# Patient Record
Sex: MALE | Race: WHITE | HISPANIC OR LATINO | Employment: FULL TIME | URBAN - METROPOLITAN AREA
[De-identification: names, ages, dates, MRNs, and addresses within clinical notes are randomized per-mention and may not be internally consistent; named-entity substitution may affect disease eponyms.]

---

## 2021-04-16 ENCOUNTER — HOSPITAL ENCOUNTER (EMERGENCY)
Facility: HOSPITAL | Age: 56
Discharge: HOME | End: 2021-04-16
Attending: SURGERY | Admitting: SURGERY
Payer: COMMERCIAL

## 2021-04-16 ENCOUNTER — APPOINTMENT (EMERGENCY)
Dept: RADIOLOGY | Facility: HOSPITAL | Age: 56
End: 2021-04-16
Payer: COMMERCIAL

## 2021-04-16 ENCOUNTER — APPOINTMENT (EMERGENCY)
Dept: CT IMAGING | Facility: HOSPITAL | Age: 56
End: 2021-04-16
Payer: COMMERCIAL

## 2021-04-16 VITALS
BODY MASS INDEX: 33.95 KG/M2 | SYSTOLIC BLOOD PRESSURE: 116 MMHG | TEMPERATURE: 97.2 F | HEART RATE: 74 BPM | OXYGEN SATURATION: 93 % | RESPIRATION RATE: 18 BRPM | WEIGHT: 223.99 LBS | DIASTOLIC BLOOD PRESSURE: 76 MMHG | HEIGHT: 68 IN

## 2021-04-16 DIAGNOSIS — S01.81XA LACERATION OF FOREHEAD, INITIAL ENCOUNTER: ICD-10-CM
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
## 2021-04-16 LAB
BASE EXCESS BLDA CALC-SCNC: 2 MMOL/L (ref -2–3)
GLUCOSE SERPL-MCNC: 144 MG/DL (ref 65–140)
HCO3 BLDA-SCNC: 27.2 MMOL/L (ref 24–30)
HCT VFR BLD CALC: 47 % (ref 36.5–49.3)
HGB BLDA-MCNC: 16 G/DL (ref 12–17)
PCO2 BLD: 28 MMOL/L (ref 21–32)
PCO2 BLD: 42.2 MM HG (ref 42–50)
PH BLD: 7.42 [PH] (ref 7.3–7.4)
PO2 BLD: 47 MM HG (ref 35–45)
POTASSIUM BLD-SCNC: 5.8 MMOL/L (ref 3.5–5.3)
SAO2 % BLD FROM PO2: 83 % (ref 60–85)
SODIUM BLD-SCNC: 136 MMOL/L (ref 136–145)
SPECIMEN SOURCE: ABNORMAL

## 2021-04-16 PROCEDURE — 71045 X-RAY EXAM CHEST 1 VIEW: CPT

## 2021-04-16 PROCEDURE — 82803 BLOOD GASES ANY COMBINATION: CPT

## 2021-04-16 PROCEDURE — 99284 EMERGENCY DEPT VISIT MOD MDM: CPT | Performed by: SURGERY

## 2021-04-16 PROCEDURE — 85014 HEMATOCRIT: CPT

## 2021-04-16 PROCEDURE — 82947 ASSAY GLUCOSE BLOOD QUANT: CPT

## 2021-04-16 PROCEDURE — 99284 EMERGENCY DEPT VISIT MOD MDM: CPT

## 2021-04-16 PROCEDURE — 93308 TTE F-UP OR LMTD: CPT | Performed by: SURGERY

## 2021-04-16 PROCEDURE — 76705 ECHO EXAM OF ABDOMEN: CPT | Performed by: SURGERY

## 2021-04-16 PROCEDURE — 90715 TDAP VACCINE 7 YRS/> IM: CPT | Performed by: NURSE PRACTITIONER

## 2021-04-16 PROCEDURE — 70450 CT HEAD/BRAIN W/O DYE: CPT

## 2021-04-16 PROCEDURE — NC001 PR NO CHARGE: Performed by: SURGERY

## 2021-04-16 PROCEDURE — 84295 ASSAY OF SERUM SODIUM: CPT

## 2021-04-16 PROCEDURE — 84132 ASSAY OF SERUM POTASSIUM: CPT

## 2021-04-16 PROCEDURE — NC001 PR NO CHARGE: Performed by: EMERGENCY MEDICINE

## 2021-04-16 PROCEDURE — 12014 RPR F/E/E/N/L/M 5.1-7.5 CM: CPT | Performed by: SURGERY

## 2021-04-16 PROCEDURE — 72125 CT NECK SPINE W/O DYE: CPT

## 2021-04-16 PROCEDURE — 90471 IMMUNIZATION ADMIN: CPT

## 2021-04-16 RX ORDER — ACETAMINOPHEN 325 MG/1
975 TABLET ORAL ONCE
Status: COMPLETED | OUTPATIENT
Start: 2021-04-16 | End: 2021-04-16

## 2021-04-16 RX ORDER — LIDOCAINE HYDROCHLORIDE AND EPINEPHRINE 10; 10 MG/ML; UG/ML
10 INJECTION, SOLUTION INFILTRATION; PERINEURAL ONCE
Status: COMPLETED | OUTPATIENT
Start: 2021-04-16 | End: 2021-04-16

## 2021-04-16 RX ORDER — GINSENG 100 MG
1 CAPSULE ORAL ONCE
Status: COMPLETED | OUTPATIENT
Start: 2021-04-16 | End: 2021-04-16

## 2021-04-16 RX ORDER — ACETAMINOPHEN 325 MG/1
975 TABLET ORAL EVERY 6 HOURS PRN
Status: DISCONTINUED | OUTPATIENT
Start: 2021-04-16 | End: 2021-04-16

## 2021-04-16 RX ADMIN — LIDOCAINE HYDROCHLORIDE AND EPINEPHRINE 10 ML: 10; 10 INJECTION, SOLUTION INFILTRATION; PERINEURAL at 03:11

## 2021-04-16 RX ADMIN — TETANUS TOXOID, REDUCED DIPHTHERIA TOXOID AND ACELLULAR PERTUSSIS VACCINE, ADSORBED 0.5 ML: 5; 2.5; 8; 8; 2.5 SUSPENSION INTRAMUSCULAR at 02:56

## 2021-04-16 RX ADMIN — ACETAMINOPHEN 975 MG: 325 TABLET, FILM COATED ORAL at 03:43

## 2021-04-16 RX ADMIN — BACITRACIN 1 SMALL APPLICATION: 500 OINTMENT TOPICAL at 03:47

## 2021-04-16 NOTE — ED PROVIDER NOTES
Emergency Department Airway Evaluation and Management Form    History  Obtained from:  Patient  Patient has no known allergies  Chief Complaint   Patient presents with    Head Laceration     Pt reports waking up in middle of the night to use restroom, tripped in the dark, went down approx 14 steps -thinners -LOC A/Ox4  +lac to L head     HPI     Patient is a 42-year-old male who reports to the emergency department after falling down 14 stairs  No loss of consciousness  He has a laceration to the left side of his head  He denies any blood thinners  Patient was drinking alcohol tonight  MDM - fall,  airway intact will hand off to trauma team        History reviewed  No pertinent past medical history  History reviewed  No pertinent surgical history  History reviewed  No pertinent family history  Social History     Tobacco Use    Smoking status: Never Smoker    Smokeless tobacco: Never Used   Substance Use Topics    Alcohol use: Not Currently     Frequency: Never    Drug use: Never     I have reviewed and agree with the history as documented  Review of Systems    See trauma h/p           Physical Exam  /81   Pulse 78   Temp (!) 97 2 °F (36 2 °C) (Temporal)   Resp 18   Ht 5' 8" (1 727 m)   Wt 102 kg (223 lb 15 8 oz)   SpO2 94%   BMI 34 06 kg/m²     Physical Exam    See trauma h/p          ED Medications  Medications   tetanus-diphtheria-acellular pertussis (BOOSTRIX) IM injection 0 5 mL (has no administration in time range)   lidocaine-epinephrine (XYLOCAINE/EPINEPHRINE) 1 %-1:100,000 injection 10 mL (has no administration in time range)       Intubation  Procedures    Notes      Final Diagnosis  Final diagnoses:   Fall, initial encounter       ED Provider  Electronically Signed by     Luis Gregg MD  04/16/21 [id number]

## 2021-04-16 NOTE — H&P
H&P Exam - [address] [facility] See Castaneda 60 y o  male MRN: [id number]  Unit/Bed#: ED 29 Encounter: [id number]    Assessment/Plan   Trauma Alert: Level B  Model of Arrival: Private vehicle  Driven by significant other  Trauma Team: Mell Obrien and NIMCO Gutierrez  Consultants: None    Trauma Active Problems:   · Fall, Down 14 strairs  · Head laceration    Trauma Plan:   · CT head and c-spine negative  · C-spine cleared  · Tetanus updated  · Laceration closed  Removal and care instructions discussed with patient and significant other  · Discussed taking tylenol or ibuprofen for pain  · Return precautions discussed with patient and sister, no questions at the time of discharge  Discharged with sister  Chief Complaint: Fall    History of Present Illness   HPI:  Stanford Bonner is a 60 y o  male who presents for evaluation following a fall down approximately 14 stairs  Described tripping  No LOC  No AC/PC  Notes laceration on head  Adamantly denies any other any other injuries  He confirms that he drank 5 beers earlier this evening  Mechanism:Fall    Review of Systems    12-point, complete review of systems was reviewed and negative except as stated above  Historical Information   History is unobtainable from the patient due patient reluctant to participate in exam   Efforts to obtain history included the following sources: family member, other medical personnel    History reviewed  No pertinent past medical history  History reviewed  No pertinent surgical history    Social History   Social History     Substance and Sexual Activity   Alcohol Use Not Currently    Frequency: Never     Social History     Substance and Sexual Activity   Drug Use Never     Social History     Tobacco Use   Smoking Status Never Smoker   Smokeless Tobacco Never Used     E-Cigarette/Vaping     E-Cigarette/Vaping Substances     Immunization History   Administered Date(s) Administered    Tdap 04/16/2021     Last Tetanus: Unknown  Updated  Family History: Non-contributory  Unable to obtain/limited by patient  Meds/Allergies   all current active meds have been reviewed    No Known Allergies      PHYSICAL EXAM    Objective   Vitals:   First set: Temperature: (!) 97 2 °F (36 2 °C) (04/16/21 0224)  Pulse: 75 (04/16/21 0219)  Respirations: 18 (04/16/21 0219)  Blood Pressure: 150/94 (04/16/21 0219)    Primary Survey:   (A) Airway: Patent  (B) Breathing: CTA, Bilaterally  (C) Circulation: Pulses:   normal, pedal  2/4 and radial  2/4  (D) Disabliity:  GCS Total:  15  (E) Expose:  Completed    Secondary Survey: (Click on Physical Exam tab above)  Physical Exam  Constitutional:       General: He is not in acute distress  Appearance: He is not toxic-appearing or diaphoretic  HENT:      Head: Normocephalic  No Dodd's sign, abrasion, right periorbital erythema or left periorbital erythema  Jaw: There is normal jaw occlusion  No trismus, tenderness, swelling or pain on movement  Right Ear: External ear normal       Left Ear: External ear normal       Nose: Nose normal  No nasal deformity  Mouth/Throat:      Mouth: Mucous membranes are moist    Eyes:      General: Lids are normal       Extraocular Movements: Extraocular movements intact  Conjunctiva/sclera: Conjunctivae normal       Pupils: Pupils are equal, round, and reactive to light  Neck:      Comments: C-Collar in place  Cardiovascular:      Rate and Rhythm: Normal rate and regular rhythm  Pulses: Normal pulses  Radial pulses are 2+ on the right side and 2+ on the left side  Dorsalis pedis pulses are 2+ on the right side and 2+ on the left side  Heart sounds: Normal heart sounds  No murmur  No friction rub  No gallop  Pulmonary:      Effort: Pulmonary effort is normal       Breath sounds: Normal breath sounds and air entry  No decreased breath sounds, wheezing, rhonchi or rales     Chest: Chest wall: No deformity, swelling or tenderness  Abdominal:      Palpations: Abdomen is soft  Tenderness: There is no abdominal tenderness  Genitourinary:     Comments: Pelvis stable  Musculoskeletal:      Comments: Patient SANTANA with 5/5 strength  No deformities  Skin:     General: Skin is warm  Capillary Refill: Capillary refill takes less than 2 seconds  Neurological:      General: No focal deficit present  Mental Status: He is alert and oriented to person, place, and time  GCS: GCS eye subscore is 4  GCS verbal subscore is 5  GCS motor subscore is 6  Sensory: Sensation is intact  Motor: Motor function is intact  Psychiatric:         Mood and Affect: Affect is blunt and angry  Speech: Speech normal          Behavior: Behavior is uncooperative  Cognition and Memory: Cognition and memory normal       Comments: Patient's mood and behavior improved through time in the ED  Clinically sober on exam           Invasive Devices     None                 Lab Results: Results: I have personally reviewed pertinent reports  ISTAT: unremarkable     Imaging/EKG Studies: FAST: negative   CT Head: negative  CT C-spine: negative  Other Studies: none    Code Status: No Order  Advance Directive and Living Will:      Power of :    POLST:

## 2021-04-16 NOTE — DISCHARGE INSTRUCTIONS
Ensure that you get your sutures removed in 7 days  Five sutures were placed (2 horizontal mattresses, 3 simple)  Return to the ER if he develops any of the symptoms that we discussed  Laceración   LO QUE NECESITA SABER:   Yuriy laceración es yuriy herida que se presenta en la piel y en el tejido blando que hay debajo de colin  Las laceraciones pueden presentarse en cualquier parte del cuerpo  INSTRUCCIONES SOBRE EL [facility]L HOSPITALARIA:   Regrese a la iraida de emergencias si:  · Está sangrando mucho o tiene sangrado que no para después de 10 minutos de aplicar presión firme y directa sobre la herida  · Nunez herida se abre  Llame a nunez médico si:  · Usted tiene fiebre o escalofríos  · Nunez laceración está enrojecida, tibia o inflamada  · En la piel de nunez herida le salen unas patti bruce  · Usted tiene drenaje cooper o amarillo saliendo de la herida que tiene mal [place]  · Usted tiene dolor que está empeorando después del [place]  · Usted tiene preguntas o inquietudes acerca de nunez condición o cuidado  Medicamentos: Es posible que usted necesite alguno de los siguientes:  · Puede administrarse podrían administrarse  Pregunte al médico cómo debe nishant talia medicamento de forma turner  Algunos medicamentos recetados para el dolor contienen acetaminofén  No tome otros medicamentos que contengan acetaminofén sin consultarlo con nunez médico  Demasiado acetaminofeno puede causar daño al hígado  Los medicamentos recetados para el dolor podrían causar estreñimiento  Pregunte a nunez médico brianna prevenir o tratar estreñimiento  · Los antibióticos ayudan a tratar o prevenir infecciones bacteriales  · [place] tami medicamentos brianna se le haya indicado  Consulte con nunez médico si usted karsten que nunez medicamento no le está ayudando o si presenta efectos secundarios  Infórmele si es alérgico a cualquier medicamento  Mantenga yuriy lista actualizada de los [employer], las vitaminas y los productos herbales que robert  Incluya los siguientes datos de los medicamentos: cantidad, frecuencia y motivo de administración  Traiga con usted la lista o los envases de las píldoras a tami citas de seguimiento  Lleve la lista de los medicamentos con usted en shelby de nevin emergencia  Siga las instrucciones de valle médico sobre el cuidado de tami heridas:  · No moje la herida  hasta que valle médico lo autorice  No sumerja valle herida en agua  No vaya a nadar hasta que valle médico lo autorice  Lave cuidadosamente la herida con agua y [place]  Seque el área con palmadas suaves o permita que se seque al aire  · [place] tami vendas cuando se mojen, estén sucios o después del lavado  Aplique un vendaje limpio según las indicaciones  No se aplique un vendaje elástico ni cinta muy apretada  No se aplique polvos ni nevin loción en valle incisión  · Aplique un ungüento antibiótico brianna se indica  Valle médico le puede formular un ungüento antibiótico para que se aplique sobre valle herida en shelby que tenga puntos de sutura  En shelby de tener cintas o tiras sobre valle incisión, permita que se sequen y se caigan solas  En sehlby que no se caigan en 14 días, retírelas con cuidado  Si usted tiene [employer] herida, no lo retire ni se lo moleste  Si el pegamento se , no lo reemplace con pegamento casero  · Revise valle herida todos los días para detectar signos de infección, brianna hinchazón, enrojecimiento o pus  Cuidados personales:  · Aplique hielo en la herida de 15 a 20 minutos cada hora o brianna se le indique  Use nevin compresa de hielo o ponga hielo triturado en nevin bolsa de plástico  [place] con nevin toalla  El hielo ayuda a evitar daño al tejido y a disminuir la inflamación y el dolor  · Use nevin férula según las indicaciones  Nevin férula lo inmovilizará y disminuirá la tensión sobre la herida  Es posible que le sirva para recuperarse más rápido  Nyla Dirk se puede usar para nevin laceración [employer] articulaciones o las áreas de valle cuerpo que se doblan  Pregunte a valle médico cómo se debe colocar y retirar valle férula  · Brian[place]urt cicatrización de valle herida aplicando un ungüento o pomada según las indicaciones  No se aplique pomadas en la herida hasta que valle médico se lo indique  Es posible que necesite esperar hasta que la herida sane  Pregunte cuál pomada debe comprar y la frecuencia con que debe usarla  Después de que la herida sane, use un bloqueador de sol sobre el área cuando se encuentre expuesta al sol  Usted debe hacer esto krista al menos 6 meses hasta 1 año después de cedric sufrido Sharlett Mais  Acuda a la consulta de control con valle médico según las indicaciones: Es posible que usted necesite programar yuriy yaima de seguimiento dentro de 24 a 50 horas para que controlen que la herida no se haya infectado  Usted tendrá que regresar dentro de 3 a 15 días para que le retiren los puntos de sutura o grapas  Cuide valle herida según las indicaciones para prevenir yuriy infección y ayudarla a sanar  Anote tami preguntas para que se acuerde de hacerlas krista tami visitas  © Copyright [address] Information is for End User's use only and may not be sold, redistributed or otherwise used for commercial purposes  All illustrations and images included in CareNotes® are the copyrighted property of [employer]  or [address] [place] [address] es sólo para uso en educación  Valle intención no es darle un consejo médico sobre enfermedades o tratamientos  Colsulte con valle [place] farmacéutico antes de seguir cualquier régimen médico para saber si es seguro y efectivo para usted

## 2021-04-16 NOTE — QUICK NOTE
Cervical Collar Clearance: The patient had a CT scan of the cervical spine demonstrating no acute injury  On exam, the patient had no midline point tenderness or paresthesias/numbness/weakness in the extremities  The patient had full range of motion (was then able to flex, extend, and rotate head laterally) without pain  There were no distracting injuries and the patient is clinically sober  The patient's cervical spine was cleared radiologically and clinically  Cervical collar removed at this time       GISSEL Wilson  4/16/2021 3:05 AM

## 2021-04-16 NOTE — PROCEDURES
POC FAST US    Date/Time: 4/16/2021 2:50 AM  Performed by: GISSEL Corral  Authorized by: Umu Corral     Patient location:  ED  Procedure details:     Exam Type:  Diagnostic    Indications: blunt abdominal trauma      Assess for:  Intra-abdominal fluid and pericardial effusion    Technique: FAST      Views obtained:  Heart - Pericardial sac, RUQ - Moran's Pouch, LUQ - Splenorenal space and Suprapubic - Pouch of Petey    Image quality: diagnostic      Image availability:  Images available in PACS  FAST Findings:     RUQ (Hepatorenal) free fluid: absent      LUQ (Splenorenal) free fluid: absent      Suprapubic free fluid: absent      Cardiac wall motion: identified      Pericardial effusion: absent    Interpretation:     Impressions: negative

## 2021-04-16 NOTE — PROCEDURES
Laceration repair    Date/Time: 4/16/2021 3:37 AM  Performed by: GISSEL Higgins  Authorized by: GISSEL Higgins   Consent: Verbal consent obtained  Risks and benefits: risks, benefits and alternatives were discussed  Consent given by: patient (Sister)  Patient understanding: patient states understanding of the procedure being performed  Radiology Images displayed and confirmed  If images not available, report reviewed: imaging studies available  Patient identity confirmed: verbally with patient and arm band  Body area: head/neck  Location details: forehead  Laceration length: 6 cm  Foreign bodies: no foreign bodies    Anesthesia:  Local Anesthetic: lidocaine 1% with epinephrine      Procedure Details:  Preparation: Patient was prepped and draped in the usual sterile fashion    Irrigation solution: saline  Irrigation method: jet lavage  Debridement: none  Degree of undermining: none  Skin closure: 4-0 Prolene  Number of sutures: 5  Technique: horizontal mattress and simple  Approximation: close  Approximation difficulty: simple  Dressing: antibiotic ointment  Patient tolerance: patient tolerated the procedure well with no immediate complications